# Patient Record
Sex: FEMALE | Race: OTHER | HISPANIC OR LATINO | Employment: FULL TIME | URBAN - METROPOLITAN AREA
[De-identification: names, ages, dates, MRNs, and addresses within clinical notes are randomized per-mention and may not be internally consistent; named-entity substitution may affect disease eponyms.]

---

## 2023-10-30 ENCOUNTER — HOSPITAL ENCOUNTER (OUTPATIENT)
Dept: RADIOLOGY | Facility: OTHER | Age: 23
Discharge: HOME | End: 2023-10-30
Attending: ORTHOPAEDIC SURGERY
Payer: COMMERCIAL

## 2023-10-30 ENCOUNTER — OFFICE VISIT (OUTPATIENT)
Dept: ORTHOPEDICS | Facility: CLINIC | Age: 23
End: 2023-10-30
Payer: COMMERCIAL

## 2023-10-30 VITALS — WEIGHT: 130 LBS

## 2023-10-30 DIAGNOSIS — M24.132 DEGENERATIVE TEAR OF TRIANGULAR FIBROCARTILAGE COMPLEX (TFCC) OF LEFT WRIST: Primary | ICD-10-CM

## 2023-10-30 DIAGNOSIS — M25.532 LEFT WRIST PAIN: Primary | ICD-10-CM

## 2023-10-30 DIAGNOSIS — G56.22 CUBITAL TUNNEL SYNDROME ON LEFT: ICD-10-CM

## 2023-10-30 DIAGNOSIS — M25.532 LEFT WRIST PAIN: ICD-10-CM

## 2023-10-30 PROCEDURE — 73110 XR WRIST COMPLETE 3 VIEWS LEFT: ICD-10-PCS | Mod: 26,LT,, | Performed by: RADIOLOGY

## 2023-10-30 PROCEDURE — 99204 OFFICE O/P NEW MOD 45 MIN: CPT | Mod: S$GLB,,, | Performed by: ORTHOPAEDIC SURGERY

## 2023-10-30 PROCEDURE — 73110 X-RAY EXAM OF WRIST: CPT | Mod: 26,LT,, | Performed by: RADIOLOGY

## 2023-10-30 PROCEDURE — 73110 X-RAY EXAM OF WRIST: CPT | Mod: TC,FY,LT

## 2023-10-30 PROCEDURE — 99999 PR PBB SHADOW E&M-NEW PATIENT-LVL II: ICD-10-PCS | Mod: PBBFAC,,, | Performed by: ORTHOPAEDIC SURGERY

## 2023-10-30 PROCEDURE — 99999 PR PBB SHADOW E&M-NEW PATIENT-LVL II: CPT | Mod: PBBFAC,,, | Performed by: ORTHOPAEDIC SURGERY

## 2023-10-30 PROCEDURE — 99204 PR OFFICE/OUTPT VISIT, NEW, LEVL IV, 45-59 MIN: ICD-10-PCS | Mod: S$GLB,,, | Performed by: ORTHOPAEDIC SURGERY

## 2023-10-30 NOTE — PROGRESS NOTES
[facility]  History & Physical  Orthopedics    SUBJECTIVE:      COVID-19 attestation:  This patient was treated during the COVID-19 pandemic.  This was discussed with the patient, they are aware of our current policies and procedures, were given the option of delaying their visit and or switching to a virtual visit, delaying their surgery when applicable, and they elect to proceed.    Chief Complaint: left wrist pain    Referring Provider: Self, Aaareferral     History of Present Illness:  Patient is a 23 y.o. right hand dominant adult who presents today with complaints of left wrist pain onset 9 years ago after her hand was hit with a soccer ball. She has continue to experience intermittent pain that worsen with active use of the hand. She recently started experiencing numbness and tingling to the small and ring fingers. Pain is affecting her ADLs and her job.     The patient is a/an  at [employer].    Onset of symptoms/DOI was 9 years.    Symptoms are aggravated by activity and movement.    Symptoms are alleviated by rest and immobilization.    Symptoms consist of pain and numbness/tingling.    The patient rates their pain as a 0/10.    Attempted treatment(s) and/or interventions include activity modifications, rest, rest and activity modification.     The patient denies any fevers, chills, N/V, D/C and presents for evaluation.       No past medical history on file.  No past surgical history on file.  Review of patient's allergies indicates:  No Known Allergies  Social History     Social History Narrative    Not on file     No family history on file.    No current outpatient medications on file.      Review of Systems:  As per HPI otherwise noncontributory    OBJECTIVE:      Vital Signs (Most Recent):  Vitals:    10/30/23 1009   Weight: 59 kg (130 lb)     There is no height or weight on file to calculate BMI.      Physical Exam:  Constitutional: The patient appears well-developed and  well-nourished. No distress.   Skin: No lesions appreciated  Head: Normocephalic and atraumatic.   Nose: Nose normal.   Ears: No deformities seen  Eyes: Conjunctivae and EOM are normal.   Neck: No tracheal deviation present.   Cardiovascular: Normal rate and intact distal pulses.    Pulmonary/Chest: Effort normal. No respiratory distress.   Abdominal: There is no guarding.   Neurological: The patient is alert.   Psychiatric: The patient has a normal mood and affect.     Left Hand/Wrist Examination:    Observation/Inspection:  Swelling  none    Deformity  none  Discoloration  none     Scars   none    Atrophy  none    HAND/WRIST EXAMINATION:   Finkelstein's Test   Neg  WHAT Test    Neg  Snuff box tenderness   Neg  Keith's Test    Neg  Hook of Hamate Tenderness  Neg  CMC grind    Neg  Circumduction test   Neg    Neurovascular Exam:   Digits WWP, brisk CR < 3s throughout  NVI motor/LTS to M/R/U nerves, radial pulse 2+  Tinel's Test - Carpal Tunnel  Neg  Tinel's Test - Cubital Tunnel  pos  Phalen's Test    Neg  Median Nerve Compression Test Neg      TTP about the ulnar side of the wrist. Medial epicondyle TTP. Small finger tingling with wrist compression.  ROM hand full, painless    ROM wrist full, painless    ROM elbow full, painless      Diagnostic Results:     Imaging - I independently viewed the patient's imaging as well as the radiology report.  Xrays of the patient's left hand  demonstrate no evidence of any acute fractures or dislocations or significant degenerative changes.    EMG - none    ASSESSMENT/PLAN:      23 y.o. adult with ulnar sided wrist pain.    Plan: The patient and I had a thorough discussion today.  We discussed the working diagnosis as well as several other potential alternative diagnoses.  Treatment options were discussed, both conservative and surgical.  Conservative treatment options would include things such as activity modifications, workplace modifications, a period of rest, oral vs topical  OTC and prescription anti-inflammatory medications, occupational therapy, splinting/bracing, immobilization, corticosteroid injections, and others.  Surgical options were discussed as well.     At this time, based on history, exam, chronicity and symptoms affecting ADLs, I would like to evaluate patient for possible TFCC tear and cubital tunnel syndrome. MRI and EMG ordered in clinic today. RTC after completion of studies for review.    Should the patient's symptoms worsen, persist, or fail to improve they should return for reevaluation and I would be happy to see them back anytime.    Terry Awan MD  Orthopaedic Surgery   Resident Physician, PGY-1  10/30/2023

## 2023-12-07 ENCOUNTER — PROCEDURE VISIT (OUTPATIENT)
Dept: NEUROLOGY | Facility: CLINIC | Age: 23
End: 2023-12-07
Payer: COMMERCIAL

## 2023-12-07 DIAGNOSIS — R20.2 NUMBNESS AND TINGLING IN LEFT HAND: ICD-10-CM

## 2023-12-07 DIAGNOSIS — R20.0 NUMBNESS AND TINGLING IN LEFT HAND: ICD-10-CM

## 2023-12-07 PROCEDURE — 95910 PR NERVE CONDUCTION STUDY; 7-8 STUDIES: ICD-10-PCS | Mod: S$GLB,,, | Performed by: PSYCHIATRY & NEUROLOGY

## 2023-12-07 PROCEDURE — 99203 PR OFFICE/OUTPT VISIT, NEW, LEVL III, 30-44 MIN: ICD-10-PCS | Mod: S$GLB,,, | Performed by: PSYCHIATRY & NEUROLOGY

## 2023-12-07 PROCEDURE — 95886 PR EMG COMPLETE, W/ NERVE CONDUCTION STUDIES, 5+ MUSCLES: ICD-10-PCS | Mod: S$GLB,,, | Performed by: PSYCHIATRY & NEUROLOGY

## 2023-12-07 PROCEDURE — 99203 OFFICE O/P NEW LOW 30 MIN: CPT | Mod: S$GLB,,, | Performed by: PSYCHIATRY & NEUROLOGY

## 2023-12-07 PROCEDURE — 95886 MUSC TEST DONE W/N TEST COMP: CPT | Mod: S$GLB,,, | Performed by: PSYCHIATRY & NEUROLOGY

## 2023-12-07 PROCEDURE — 95910 NRV CNDJ TEST 7-8 STUDIES: CPT | Mod: S$GLB,,, | Performed by: PSYCHIATRY & NEUROLOGY

## 2023-12-07 NOTE — PROCEDURES
EMG W/ ULTRASOUND AND NERVE CONDUCTION TEST 2 Extremities    Date/Time: 12/7/2023 3:00 PM    Performed by: Jose Palmer MD  Authorized by: Raphael Jessica MD                                                                 [facility] [address] Neurology    Subjective:       Patient ID: Ashleydom Blum is a 23 y.o. adult here for a EMG focused evaluation for arm pain. Previous visits and diagnostic evaluation reviewed.  Patient describes several years of left wrist and hand pain.  Recently over the past few months there has been numbness of the left hand specifically involving the left 4th and 5th digits.  Symptoms are overall intermittent and worse with maneuvers such as typing.  HPI  Review of patient's allergies indicates:  No Known Allergies   There were no vitals filed for this visit.   Chief Complaint: No chief complaint on file.    History reviewed. No pertinent past medical history.   Social History     Socioeconomic History    Marital status: Significant Other      Review of Systems:   No Fever  No SOB  No vomiting  No visual disturbance      Objective:      Physical Exam    Constitutional: Patient appears well-nourished.   Head: Normocephalic and atraumatic.   Mouth/Throat: Oropharynx is clear and moist.   Pulmonary/Chest: Effort normal.   Abdominal: Soft.   Skin: Skin is warm and dry.      General:  Patient is alert and cooperative.  Affect:  Patient is appropriate to surroundings and environment.  Language:  Speech is fluent.  HEENT:  There are no outward signs of trauma to head or face.  Cranial Nerves:  Pupils are equal round and reactive to light. Extra-ocular movements are intact. Face, tongue, and palate are symmetrical.  Motor:  Patient exhibits normal strength testing in bilateral proximal and distal upper extremities.  Reflexes:  Symmetrical in bilateral upper extremities.  Gait:  Ambulation is independent without use of cane or walker without signs of ataxia or  circumduction.  Cerebellar:  Normal finger to nose testing without dysmetria.  Sensory:  Intact to sensory modalities tested.  Musculoskeletal:  There is no severe tenderness to palpation and manipulation of the cervical spine region.   Assessment:       We reviewed and discussed at length results of EMG of the left upper extremity performed today which was normal.  Specifically no significant evidence of ulnar neuropathy.  We did discuss possibly repeating this study in 8-10 months if symptoms of numbness persist or worsen. These findings are available via media section of chart review.   1. Numbness and tingling in left hand              Plan:       We discussed treatment options at length. Recommend patient keep appointment with referring provider.         I spent a total of 35 minutes on the day of the visit. This includes face to face time and non-face to face time preparing to see the patient (eg, review of tests), obtaining and/or reviewing separately obtained history, documenting clinical information in the electronic or other health record, independently interpreting results and communicating results to the patient/family/caregiver, or care coordinator  .  Jose Palmer MD, FAAN   12/07/2023   3:20 PM     A dictation device was used to produce this document. Use of such devices sometimes results in grammatical errors or replacement of words that sound similarly.

## 2023-12-19 ENCOUNTER — HOSPITAL ENCOUNTER (OUTPATIENT)
Dept: RADIOLOGY | Facility: HOSPITAL | Age: 23
Discharge: HOME | End: 2023-12-19
Attending: ORTHOPAEDIC SURGERY
Payer: COMMERCIAL

## 2023-12-19 DIAGNOSIS — M24.132 DEGENERATIVE TEAR OF TRIANGULAR FIBROCARTILAGE COMPLEX (TFCC) OF LEFT WRIST: ICD-10-CM

## 2023-12-19 PROCEDURE — 73221 MRI WRIST WITHOUT CONTRAST LEFT: ICD-10-PCS | Mod: 26,LT,, | Performed by: RADIOLOGY

## 2023-12-19 PROCEDURE — 73221 MRI JOINT UPR EXTREM W/O DYE: CPT | Mod: TC,LT

## 2023-12-19 PROCEDURE — 73221 MRI JOINT UPR EXTREM W/O DYE: CPT | Mod: 26,LT,, | Performed by: RADIOLOGY

## 2024-01-29 ENCOUNTER — OFFICE VISIT (OUTPATIENT)
Dept: ORTHOPEDICS | Facility: CLINIC | Age: 24
End: 2024-01-29
Payer: COMMERCIAL

## 2024-01-29 DIAGNOSIS — M25.532 LEFT WRIST PAIN: Primary | ICD-10-CM

## 2024-01-29 PROCEDURE — 99999 PR PBB SHADOW E&M-EST. PATIENT-LVL II: CPT | Mod: PBBFAC,,, | Performed by: ORTHOPAEDIC SURGERY

## 2024-01-29 PROCEDURE — 99213 OFFICE O/P EST LOW 20 MIN: CPT | Mod: S$GLB,,, | Performed by: ORTHOPAEDIC SURGERY

## 2024-01-29 NOTE — PROGRESS NOTES
[facility]  History & Physical  Orthopedics    SUBJECTIVE:      COVID-19 attestation:  This patient was treated during the COVID-19 pandemic.  This was discussed with the patient, they are aware of our current policies and procedures, were given the option of delaying their visit and or switching to a virtual visit, delaying their surgery when applicable, and they elect to proceed.    Chief Complaint: left wrist pain    Referring Provider: No ref. provider found     History of Present Illness:  Patient is a 23 y.o. right hand dominant adult who presents today with in follow-up for ulnar-sided left wrist pain as well as numbness and tingling in the left small and ring fingers.  The patient reports that symptoms are stable from last visit.  EMG and MRI were obtained and the patient returns for these results and re-evaluation today with no other new complaints.    The patient is a/an  at [employer].    Onset of symptoms/DOI was 9 years.    Symptoms are aggravated by activity and movement.    Symptoms are alleviated by rest and immobilization.    Symptoms consist of pain and numbness/tingling.    The patient rates their pain as a 0/10.    Attempted treatment(s) and/or interventions include activity modifications, rest, rest and activity modification.     The patient denies any fevers, chills, N/V, D/C and presents for evaluation.       No past medical history on file.  No past surgical history on file.  Review of patient's allergies indicates:  No Known Allergies  Social History     Social History Narrative    Not on file     No family history on file.    No current outpatient medications on file.      Review of Systems:  As per HPI otherwise noncontributory    OBJECTIVE:      Vital Signs (Most Recent):  There were no vitals filed for this visit.    There is no height or weight on file to calculate BMI.      Physical Exam:  Constitutional: The patient appears well-developed and well-nourished.  No distress.   Skin: No lesions appreciated  Head: Normocephalic and atraumatic.   Nose: Nose normal.   Ears: No deformities seen  Eyes: Conjunctivae and EOM are normal.   Neck: No tracheal deviation present.   Cardiovascular: Normal rate and intact distal pulses.    Pulmonary/Chest: Effort normal. No respiratory distress.   Abdominal: There is no guarding.   Neurological: The patient is alert.   Psychiatric: The patient has a normal mood and affect.     Left Hand/Wrist Examination:    Observation/Inspection:  Swelling  none    Deformity  none  Discoloration  none     Scars   none    Atrophy  none    HAND/WRIST EXAMINATION:   Finkelstein's Test   Neg  WHAT Test    Neg  Snuff box tenderness   Neg  Keith's Test    Neg  Hook of Hamate Tenderness  Neg  CMC grind    Neg  Circumduction test   Neg    Neurovascular Exam:   Digits WWP, brisk CR < 3s throughout  NVI motor/LTS to M/R/U nerves, radial pulse 2+  Tinel's Test - Carpal Tunnel  Neg  Tinel's Test - Cubital Tunnel  negative today  Phalen's Test    Neg  Median Nerve Compression Test Neg      TTP about the ulnar side of the wrist    ROM hand full, painless    ROM wrist full, painless    ROM elbow full, painless      Diagnostic Results:     Imaging - I independently viewed the patient's imaging as well as the radiology report.  Xrays of the patient's left hand  demonstrate no evidence of any acute fractures or dislocations or significant degenerative changes.    MRI left wrist-Impression:     1. Intact triangular fibrocartilage and interosseous ligaments.  2. No osseous or articular abnormalities.  3. Intact flexor and extensor tendons.  4. No ganglion cyst.    EMG - normal study of the left upper extremity    ASSESSMENT/PLAN:      23 y.o. adult with left hand numbness, left ulnar-sided wrist pain      Plan: The patient and I had a thorough discussion today.  We discussed the working diagnosis as well as several other potential alternative diagnoses.  Treatment options  were discussed, both conservative and surgical.  Conservative treatment options would include things such as activity modifications, workplace modifications, a period of rest, oral vs topical OTC and prescription anti-inflammatory medications, occupational therapy, splinting/bracing, immobilization, corticosteroid injections, and others.  Surgical options were discussed as well.     At this point in time, MRI and EMG do not demonstrate any focal pathology.  I would recommend a course of prescription strength ibuprofen which will be sent to the patient's pharmacy today as well as a course of occupational therapy.  Wrist widget advised.  Follow-up in 2-3 months should symptoms worsen persist or fail to improve.

## 2024-03-25 ENCOUNTER — CLINICAL SUPPORT (OUTPATIENT)
Dept: REHABILITATION | Facility: OTHER | Age: 24
End: 2024-03-25
Attending: ORTHOPAEDIC SURGERY
Payer: COMMERCIAL

## 2024-03-25 DIAGNOSIS — M25.532 LEFT WRIST PAIN: ICD-10-CM

## 2024-03-25 PROCEDURE — 97110 THERAPEUTIC EXERCISES: CPT | Mod: PN

## 2024-03-25 PROCEDURE — 97165 OT EVAL LOW COMPLEX 30 MIN: CPT | Mod: PN

## 2024-03-25 NOTE — PATIENT INSTRUCTIONS
[facility], OCCUPATIONAL THERAPY  HOME EXERCISE PROGRAM         Composite wrist flexion stretch:  - Begin with your elbow bent and by your side  - Position hand palm down and make a gentle fist  - Use your other hand to gently bend fist down towards the floor  - You should feel a gentle stretch, no pain! If you need more of a stretch you can begin to straighten the elbow to the point you feel a comfortable stretch    Hold 30 seconds. Repeat 3x.      Alternative:      Composite wrist flexion stretch:  -Interlace your fingers as shown  -Then use your bottom most wrist to bend the top wrist into a more flexed position for a stretch.     Hold for 30 seconds. Repeat 3x.      Complete the following strengthening exercises 2x/day.  Hold each position x3 seconds then relax. Complete 20 repetitions each.       Resisted Wrist Extension   - Make a fist first  - With forearm resting palm down, resist upward movement   of hand with other hand.           Resisted Wrist Radial Deviation  With forearm resting with thumb up, use other hand to   resist upward movement of hand at wrist.       Therapist: VIC Garner/MONO

## 2024-03-26 PROBLEM — M25.532 LEFT WRIST PAIN: Status: ACTIVE | Noted: 2024-03-26

## 2024-03-26 NOTE — PLAN OF CARE
[facility] Occupational Therapy  Initial Evaluation     Date: 3/25/2024  Patient: Ashley Blum  Chart Number: [id number]    Therapy Diagnosis:   1. Left wrist pain  Ambulatory referral/consult to Physical/Occupational Therapy        Medical Diagnosis: M25.532 (ICD-10-CM) - Left wrist pain    Referring Physician: Raphael Jessica MD  Physician Orders: Eval and Treat  Date of Return to MD: PRN    Date of Injury: 9 years ago  Date of Surgery: NA    Evaluation Date: 3/25/2024  Authorization Period: 5/31/23 - 5/31/24  Plan of Care Expiration: 5/17/24  Visit #/ Visits Authorized: 1 of 1  FOTO Completion: Initial eval (3/25/2024)  FOTO #2:  FOTO #3:    Time In: 5:00 pm  Time Out: 5:40 pm  Total Appointment Time (timed & untimed codes): 40 min    Precautions: Standard    Subjective     History of Current Condition: Ashley Blum is a 23 y.o. year old Right hand dominant adult who reports Left ulnar wrist pain for the past 9 years. She reports it began when a friend kicked a soccer ball and as goalie she tried to deflect the ball, hitting the ulnar wrist. Within the last year, she developed numbness/tingling on radial thumb and SF in the morning. Ashley Blum is referred to Occupational Therapy for evaluation and treatment. Patient presents today alone.    Falls: none    Involved Side: Left  Dominant Side: Right    Date of Onset: 9 years ago  Imaging: MRI 10/30/23  Impression:  1. Intact triangular fibrocartilage and interosseous ligaments.  2. No osseous or articular abnormalities.  3. Intact flexor and extensor tendons.  4. No ganglion cyst.    EMG - normal study of the left upper extremity      Previous Therapy: none    Pain:  Functional Pain Scale Rating 0-10:   Current: 0/10  At Best: 0/10  At Worst: 5/10    Location: Left ulnar wrist  Description: throbbing, shooting, tingling  Aggravating Factors: holding phone, typing, holding bag of  groceries  Easing Factors: none    Functional Limitations/Social History:  Prior Level of Function: Independent with all ADLs/IADLs    Current Level of Function:   ADLs: pain at times washing hair, otherwise remains independent  IADLs: reports pain with heavy lifting (carrying groceries), uses primarily dominant RUE  Leisure: painting, writing, sedentary  Driving: Yes    Occupation:  lab tech at [employer]  Working presently: employed  Duties: computer work, typing    Patient's Goals for Therapy: to improve functional use of LUE, prevent it from getting worse    Past Medical History/Physical Systems Review:   Ashley Blum  has no past medical history on file.    Ashley Blum  has no past surgical history on file.    Ashley currently has no medications in their medication list.    Review of patient's allergies indicates:  No Known Allergies       Objective     Mental status: alert, oriented x3    Observation/Appearance:   No atrophy, skin colors changes or bruises noted    Sensation: Patient reports she wakes up with numbness/tingling in SF, not formally tested today      ELBOW, WRIST RANGE OF MOTION:   Measured in degrees of active motion with goniometer   Right  3/25/2024 Left  3/25/2024   Elbow Extension/Flexion WNL WNL   Pronation/Supination WNL WNL   Wrist Extension/Flexion 65/75 65/75   Ulnar/Radial Deviation 35/20 35/20   Composite Wrist Extension/Flexion 60/55 60/40       STRENGTH: (Measured in pounds using a Dynamometer and pinch meter)   Right  3/25/2024 Left  3/25/2024    Setting 2 60, 50, 55 40, 40, 45    Average 55 lb 42 lb   Key 9 9.3   3 Pt 13.6 13.4       Special Testing:  Nela elbow flexion test: negative    MMT:  Wrist ext: 5  Wrist flex: 5  RD: 5  UD: 5  Pro: 5/5, mild pain shooting up elbow from ECU  Sup: 5/5    Weightbearing:  R: 60 lb  L: 60 lb      Intake Outcome Measure for FOTO Initial Evaluation Survey    Therapist reviewed FOTO scores for Ashley  Phuong Blum on 3/25/2024.   FOTO documents entered into [employer] - see Media section.    Intake Score: 64%         Treatment     Treatment Time In: 5:30 pm  Treatment Time Out: 5:40 pm  Total Treatment time separate from Evaluation time: 10 min    Ashley performed therapeutic exercises for 10 minutes including:  - Applied KT I band about pisiform for dorsal boost, instructed to remove with cold soap/water if irritation occurs  - Composite wrist flexion stretch (3 x 30 sec)  - Isometrics wrist extension, RD (20 reps, 3 sec hold)      Patient Education and Home Exercises     Patient/Family Education Provided:   - Role of OT, goals for OT, scheduling/cancellations - pt verbalized understanding. Discussed insurance limitations with patient.    Written Home Exercises Provided: yes.  Exercises were reviewed and Ashley was able to demonstrate them prior to the end of the session.  Ashley demonstrated good  understanding of the education provided. See EMR under Patient Instructions for exercises provided during therapy sessions.     Pt was advised to perform these exercises free of pain, and to stop performing them if pain occurs.      Assessment     Ashley Blum is a 23 y.o. adult referred to outpatient occupational therapy and presents with a medical diagnosis of Left wrist pain. EMG and MRI imaging were normal, no significant findings. Her ROM and strength are WNL, mild tightness noted of extrinsic extensors. She has no pain with weightbearing. Pain is about ulnar wrist, along ECU with resisted pronation.  She will benefit from continued skilled OT services to progress with ROM and strength and facilitate increased functional use of LUE.    Following medical record review it is determined that pt will benefit from occupational therapy services in order to maximize pain free and/or functional use of left wrist. The following goals were discussed with the patient and patient is in agreement with  them as to be addressed in the treatment plan. The patient's rehab potential is Good.     Anticipated barriers to occupational therapy: none    Plan of care discussed with patient: Yes    Patient's spiritual, cultural and educational needs considered and patient is agreeable to the plan of care and goals as stated below:     Medical Necessity is demonstrated by the following  Occupational Profile/History  Co-morbidities and personal factors that may impact the plan of care [x] LOW: Brief chart review  [] MODERATE: Expanded chart review   [] HIGH: Extensive chart review    Moderate / High Support Documentation:      Examination  Performance deficits relating to physical, cognitive or psychosocial skills that result in activity limitations and/or participation restrictions  [x] LOW: addressing 1-3 Performance deficits  [] MODERATE: 3-5 Performance deficits  [] HIGH: 5+ Performance deficits (please support below)    Moderate / High Support Documentation:    Physical:  Muscle Power/Strength  Pain    Cognitive:  No Deficits    Psychosocial:    Habits  Routines  Rituals     Treatment Options [x] LOW: Limited options  [] MODERATE: Several options  [] HIGH: Multiple options      Decision Making/ Complexity Score: low         The following goals were discussed with the patient and patient is in agreement with them as to be addressed in the treatment plan.     Long Term Goals (to be met by discharge):  Date Goal Met:     1.) Ashley Blum will demonstrate significantly improved functional performance from re-assessment as measured by a FOTO Intake score of more than 74.    Goal Status:   In progress    2.) Ashley Blum will return to near to prior level of function for ADLs and household management reporting independence or modified independence.    Goal Status:   In progress    3. Ashley Blum will report pain 2 out of 10 at worst to increase functional use of affected hand  for work and leisure tasks.    Goal Status:   In progress     Short Term Goals (to be met by 4/23/24):  Date Goal Met:     Ashley Blum will be independent with home exercise program with written instructions.    Goal Status:   In progress    Ashley Blum will demonstrate 50 degrees of composite wrist flexion to improve functional performance in ADLs/work/leisure tasks.    Goal Status:   In progress    Ashley Blum will demonstrate no pain with MMT of wrist/forearm to improve functional grasp for ADLs/work/leisure tasks.    Goal Status:   In progress    Ashley Blum will demonstrate the ability to complete ADL/IADL tasks with 4/10 pain.    Goal Status:   In progress       Plan     Pt to be treated by Occupational Therapy 2 times per week for 12 weeks during the certification period from 3/25/2024 to 5/17/24 to achieve the established goals.     Treatment to include: Paraffin, Fluidotherapy, Manual therapy/joint mobilizations, Modalities for pain management, US 3 mhz, Therapeutic exercises/activities., Iontophoresis with 2.0 cc Dexamethasone, Strengthening, Orthotic Fabrication/Fit/Training, Edema Control, Electrical Modalities, Joint Protection, and Energy Conservation, as well as any other treatments deemed necessary based on the patient's needs or progress.       Lita Montiel OTR/L

## 2024-04-08 ENCOUNTER — CLINICAL SUPPORT (OUTPATIENT)
Dept: REHABILITATION | Facility: OTHER | Age: 24
End: 2024-04-08
Payer: COMMERCIAL

## 2024-04-08 DIAGNOSIS — M25.532 LEFT WRIST PAIN: Primary | ICD-10-CM

## 2024-04-08 PROCEDURE — 97018 PARAFFIN BATH THERAPY: CPT | Mod: PN | Performed by: OCCUPATIONAL THERAPIST

## 2024-04-08 PROCEDURE — 97530 THERAPEUTIC ACTIVITIES: CPT | Mod: PN | Performed by: OCCUPATIONAL THERAPIST

## 2024-04-08 PROCEDURE — 97140 MANUAL THERAPY 1/> REGIONS: CPT | Mod: PN | Performed by: OCCUPATIONAL THERAPIST

## 2024-04-08 NOTE — PROGRESS NOTES
Occupational Therapy Treatment Note     Date: 4/8/2024  Name: Ashley Blum  Clinic Number: [id number]    Therapy Diagnosis:   Encounter Diagnosis   Name Primary?    Left wrist pain Yes     Physician: Raphael Jessica MD    Medical Diagnosis: M25.532 (ICD-10-CM) - Left wrist pain  Physician Orders: Eval and Treat  Date of Return to MD: PRN     Date of Injury: 9 years ago  Date of Surgery: NA     Evaluation Date: 3/25/2024  Authorization Period: 5/31/23 - 5/31/24  Plan of Care Expiration: 5/17/24    FOTO Completion: Initial eval (3/25/2024)  FOTO #2:  FOTO #3:    Visit # / Visits authorized: 2 / 20    Time In: 9:00 am  Time Out: 9:45 am  Total Billable Time: 45 minutes    Precautions:  Standard      Subjective     Pt reports: she has less pain in L wrist  Ashley Blum was compliant with home exercise program given last session.   Response to previous treatment: decreased pain    Pain: 2/10  Location: left wrist     OBJECTIVE     ELBOW, WRIST RANGE OF MOTION:   Measured in degrees of active motion with goniometer    Right  3/25/2024 Left  3/25/2024   Elbow Extension/Flexion WNL WNL   Pronation/Supination WNL WNL   Wrist Extension/Flexion 65/75 65/75   Ulnar/Radial Deviation 35/20 35/20   Composite Wrist Extension/Flexion 60/55 60/40         STRENGTH: (Measured in pounds using a Dynamometer and pinch meter)    Right  3/25/2024 Left  3/25/2024    Setting 2 60, 50, 55 40, 40, 45    Average 55 lb 42 lb   Key 9 9.3   3 Pt 13.6 13.4         Special Testing:  Nela elbow flexion test: negative     MMT:  Wrist ext: 5  Wrist flex: 5  RD: 5  UD: 5  Pro: 5/5, mild pain shooting up elbow from ECU  Sup: 5/5     Weightbearing:  R: 60 lb  L: 60 lb    Treatment     Ashley received the following supervised modalities after being cleared for contradictions for 10 minutes:   -Paraffin to decrease pain and stiffness and increase tissue elasticity    Ashley received the following manual therapy  techniques for 10 minutes:   -IASTM to decrease stiffness and adhesions in surrounding tissues    Ashley received therapeutic activities for 25 minutes including:  -wrist stretches 2 x 30 sec hold  -wrist maze x 2 min  -wrist 3 ways with 1# x 20   -red theraputty ,  and pull and dowel press x 5 min  -Applied KT tape along ECU with anchor around ulnar wrist    Home Exercises and Education Provided     Education provided:   - diagnosis, precautions, activity modification and progression of treatment  - Progress towards goals     Written Home Exercises Provided: Patient instructed to cont prior HEP.  Exercises were reviewed and Ashley was able to demonstrate them prior to the end of the session.  Ashley demonstrated good  understanding of the HEP provided.   .   See EMR under Patient Instructions for exercises provided prior visit.        Assessment     Pt would continue to benefit from skilled OT. She arrived with reports of decreased pain in ulnar wrist. Pt demonstrates positive ECU synergy test. She tolerated all activities without difficulty. Educated pt on activity modification. Plan to progress as tolerated.     Ashley is progressing well towards Ashley Blum's goals and there are no updates to goals at this time. Pt prognosis is Excellent.     Pt will continue to benefit from skilled outpatient occupational therapy to address the deficits listed in the problem list on initial evaluation provide pt/family education and to maximize pt's level of independence in the home and community environment.     Anticipated barriers to occupational therapy: none    Pt's spiritual, cultural and educational needs considered and pt agreeable to plan of care and goals.    Goals:  Long Term Goals (to be met by discharge):  Date Goal Met:       1.) Ashley Baca Kulwant will demonstrate significantly improved functional performance from re-assessment as measured by a FOTO Intake score of more than  74.     Goal Status:   In progress     2.) Ashley Blum will return to near to prior level of function for ADLs and household management reporting independence or modified independence.     Goal Status:   In progress     3. Ashley Blum will report pain 2 out of 10 at worst to increase functional use of affected hand for work and leisure tasks.     Goal Status:   In progress      Short Term Goals (to be met by 4/23/24):  Date Goal Met:       Ashley Blum will be independent with home exercise program with written instructions.     Goal Status:   In progress     Ashley Blum will demonstrate 50 degrees of composite wrist flexion to improve functional performance in ADLs/work/leisure tasks.     Goal Status:   In progress     Ashley Blum will demonstrate no pain with MMT of wrist/forearm to improve functional grasp for ADLs/work/leisure tasks.     Goal Status:   In progress     Ashley Blum will demonstrate the ability to complete ADL/IADL tasks with 4/10 pain.     Goal Status:   In progress       Plan   Continue with POC  Updates/Grading for next session: progress as tolerated      Margaret Boasberg, OT

## 2024-08-28 ENCOUNTER — TELEPHONE (OUTPATIENT)
Dept: INTERNAL MEDICINE | Facility: CLINIC | Age: 24
End: 2024-08-28
Payer: COMMERCIAL

## 2024-08-28 NOTE — TELEPHONE ENCOUNTER
Called and spoke with pt---Ashley was already informed about how to go about dealing with insurance and medical records.

## 2024-08-28 NOTE — TELEPHONE ENCOUNTER
----- Message from Dain Cole sent at 8/28/2024 10:38 AM CDT -----  Contact: [phone]@[email or web address]  Good morning patient would like a call back to discuss if her ins is covered by the doc and also to see how to get all of her old records sent to the doc now. Please call patient to advise  [phone]

## 2024-10-18 ENCOUNTER — OFFICE VISIT (OUTPATIENT)
Dept: INTERNAL MEDICINE | Facility: CLINIC | Age: 24
End: 2024-10-18
Payer: COMMERCIAL

## 2024-10-18 VITALS
HEIGHT: 62 IN | OXYGEN SATURATION: 97 % | HEART RATE: 80 BPM | SYSTOLIC BLOOD PRESSURE: 116 MMHG | BODY MASS INDEX: 25.55 KG/M2 | DIASTOLIC BLOOD PRESSURE: 84 MMHG | WEIGHT: 138.88 LBS

## 2024-10-18 DIAGNOSIS — Z00.00 ANNUAL PHYSICAL EXAM: Primary | ICD-10-CM

## 2024-10-18 DIAGNOSIS — Z12.4 CERVICAL CANCER SCREENING: ICD-10-CM

## 2024-10-18 DIAGNOSIS — Z30.9 ENCOUNTER FOR CONTRACEPTIVE MANAGEMENT, UNSPECIFIED TYPE: ICD-10-CM

## 2024-10-18 DIAGNOSIS — L68.0 HIRSUTISM: ICD-10-CM

## 2024-10-18 PROCEDURE — 99385 PREV VISIT NEW AGE 18-39: CPT | Mod: S$GLB,,, | Performed by: PHYSICIAN ASSISTANT

## 2024-10-18 PROCEDURE — 99999 PR PBB SHADOW E&M-EST. PATIENT-LVL IV: CPT | Mod: PBBFAC,,, | Performed by: PHYSICIAN ASSISTANT

## 2024-10-18 RX ORDER — SERTRALINE HYDROCHLORIDE 200 MG/1
CAPSULE ORAL
COMMUNITY
Start: 2024-03-18

## 2024-10-18 RX ORDER — SERTRALINE HYDROCHLORIDE 100 MG/1
100 TABLET, FILM COATED ORAL DAILY
COMMUNITY
End: 2024-10-18 | Stop reason: SDUPTHER

## 2024-10-18 RX ORDER — BUPROPION HYDROCHLORIDE 150 MG/1
150 TABLET ORAL DAILY
COMMUNITY
End: 2024-10-18 | Stop reason: SDUPTHER

## 2024-10-18 RX ORDER — BUPROPION HYDROCHLORIDE 150 MG/1
150 TABLET, EXTENDED RELEASE ORAL
COMMUNITY
Start: 2022-11-17

## 2024-10-18 NOTE — PATIENT INSTRUCTIONS
Spironolactone for facial hair    Over the counter fungal cream like Lotrimin     Let me know if you would like to see Dermatology    Referral to GYN    Schedule fasting lab.

## 2024-10-20 RX ORDER — LEVONORGESTREL 13.5 MG/1
1 INTRAUTERINE DEVICE INTRAUTERINE ONCE
COMMUNITY

## 2024-10-20 NOTE — PROGRESS NOTES
Subjective:       Patient ID: Ashleydom Blum is a 24 y.o. adult.    Chief Complaint: Annual Exam      Established pt of No, Primary Doctor (new to me)    HPI    New pt here for annual exam          Has concerns about menstrual cycle, hormones and fertility. Hx of menorrhagia and dysmenorrhea, has tried Copper IUD, now with Ruby, with improvement, c/o facial hair.    Follows with outside psychiatrist, Dr. Rogers for ADD and ?autism spectrum disorder, on Wellbutrin and Zoloft.       Past Medical History:   Diagnosis Date    Depression     Migraines        Social History     Tobacco Use    Smoking status: Never     Passive exposure: Never    Smokeless tobacco: Never    Tobacco comments:     N/A   Substance Use Topics    Alcohol use: Yes     Alcohol/week: 2.0 - 3.0 standard drinks of alcohol     Types: 2 - 3 Glasses of wine per week     Comment: I drink on average, 2-4 different occasions per month    Drug use: Not Currently     Types: Marijuana     Comment[place]       Review of patient's allergies indicates:  No Known Allergies      Current Outpatient Medications:     buPROPion (WELLBUTRIN SR) 150 MG TBSR 12 hr tablet, 150 mg., Disp: , Rfl:     sertraline 200 mg Cap, , Disp: , Rfl:     Family History   Problem Relation Name Age of Onset    Migraines Mother      Post-traumatic stress disorder Mother      Arthritis Father      Hypertension Father      Asthma Brother      Hypertension Maternal Grandmother      Diabetes Maternal Grandmother      Post-traumatic stress disorder Maternal Grandmother      Dementia Maternal Grandmother      Post-traumatic stress disorder Maternal Grandfather         History reviewed. No pertinent surgical history.    Review of Systems   Constitutional:  Negative for chills, fever and unexpected weight change.   Respiratory:  Negative for cough, shortness of breath and wheezing.    Cardiovascular:  Negative for chest pain  "and leg swelling.   Gastrointestinal:  Negative for abdominal pain, nausea and vomiting.   Genitourinary:         As per HPI   Integumentary:  Negative for rash.   Neurological:  Negative for weakness, light-headedness and headaches.       Objective: /84 (BP Location: Right arm, Patient Position: Sitting)   Pulse 80   Ht 5' 2" (1.575 m)   Wt 63 kg (138 lb 14.2 oz)   SpO2 97%   BMI 25.40 kg/m²         Physical Exam  Vitals reviewed.   Constitutional:       General: Ashley is not in acute distress.     Appearance: Ashley is well-developed. Ashley is not ill-appearing.   HENT:      Head: Normocephalic and atraumatic.      Right Ear: Tympanic membrane, ear canal and external ear normal.      Left Ear: Tympanic membrane, ear canal and external ear normal.   Cardiovascular:      Rate and Rhythm: Normal rate and regular rhythm.      Heart sounds: No murmur heard.  Pulmonary:      Effort: Pulmonary effort is normal.      Breath sounds: Normal breath sounds. No wheezing or rales.   Abdominal:      General: Bowel sounds are normal.      Palpations: Abdomen is soft.      Tenderness: There is no abdominal tenderness.   Musculoskeletal:      Right lower leg: No edema.      Left lower leg: No edema.   Lymphadenopathy:      Cervical: No cervical adenopathy.   Skin:     General: Skin is warm and dry.      Findings: Rash (small scaly patch under right breast) present.      Comments: Coarse hairs noted to chin   Neurological:      Mental Status: Ashley is alert and oriented to person, place, and time.   Psychiatric:         Mood and Affect: Mood normal.         Assessment:       1. Annual physical exam    2. Encounter for contraceptive management, unspecified type    3. Hirsutism    4. Cervical cancer screening        Plan:             Ashley was seen today for annual exam.    Diagnoses and all orders for this visit:    Annual physical exam  HM reviewed and updated  -     CBC Auto Differential; Future  -     Comprehensive " Metabolic Panel; Future  -     TSH; Future  -     Lipid Panel; Future  -     Hemoglobin A1C; Future  -     Vitamin D; Future    Encounter for contraceptive management, unspecified type  -     Ambulatory referral/consult to Gynecology; Future    Hirsutism  -     Ambulatory referral/consult to Gynecology; Future  -     TESTOSTERONE; Future    Cervical cancer screening  -     Ambulatory referral/consult to Gynecology; Future      Complete GIOVANI to obtain records from [place]    Patient Instructions   Spironolactone for facial hair    Over the counter fungal cream like Lotrimin     Let me know if you would like to see Dermatology    Referral to GYN    Schedule fasting lab.     Prudence Rao PA-C

## 2024-10-21 ENCOUNTER — LAB VISIT (OUTPATIENT)
Dept: LAB | Facility: HOSPITAL | Age: 24
End: 2024-10-21
Payer: COMMERCIAL

## 2024-10-21 DIAGNOSIS — Z00.00 ANNUAL PHYSICAL EXAM: ICD-10-CM

## 2024-10-21 DIAGNOSIS — L68.0 HIRSUTISM: ICD-10-CM

## 2024-10-21 LAB
25(OH)D3+25(OH)D2 SERPL-MCNC: 18 NG/ML (ref 30–96)
ALBUMIN SERPL BCP-MCNC: 4.3 G/DL (ref 3.5–5.2)
ALP SERPL-CCNC: 83 U/L (ref 40–150)
ALT SERPL W/O P-5'-P-CCNC: 26 U/L (ref 10–44)
ANION GAP SERPL CALC-SCNC: 10 MMOL/L (ref 8–16)
AST SERPL-CCNC: 31 U/L (ref 10–40)
BASOPHILS # BLD AUTO: 0.03 K/UL (ref 0–0.2)
BASOPHILS NFR BLD: 0.4 % (ref 0–1.9)
BILIRUB SERPL-MCNC: 0.5 MG/DL (ref 0.1–1)
BUN SERPL-MCNC: 8 MG/DL (ref 6–20)
CALCIUM SERPL-MCNC: 9.6 MG/DL (ref 8.7–10.5)
CHLORIDE SERPL-SCNC: 106 MMOL/L (ref 95–110)
CHOLEST SERPL-MCNC: 218 MG/DL (ref 120–199)
CHOLEST/HDLC SERPL: 3.5 {RATIO} (ref 2–5)
CO2 SERPL-SCNC: 23 MMOL/L (ref 23–29)
CREAT SERPL-MCNC: 0.8 MG/DL (ref 0.5–1.4)
DIFFERENTIAL METHOD BLD: NORMAL
EOSINOPHIL # BLD AUTO: 0.1 K/UL (ref 0–0.5)
EOSINOPHIL NFR BLD: 1.1 % (ref 0–8)
ERYTHROCYTE [DISTWIDTH] IN BLOOD BY AUTOMATED COUNT: 12.2 % (ref 11.5–14.5)
EST. GFR  (NO RACE VARIABLE): >60 ML/MIN/1.73 M^2
ESTIMATED AVG GLUCOSE: 94 MG/DL (ref 68–131)
GLUCOSE SERPL-MCNC: 80 MG/DL (ref 70–110)
HBA1C MFR BLD: 4.9 % (ref 4–5.6)
HCT VFR BLD AUTO: 41.8 % (ref 37–48.5)
HDLC SERPL-MCNC: 62 MG/DL (ref 40–75)
HDLC SERPL: 28.4 % (ref 20–50)
HGB BLD-MCNC: 14.2 G/DL (ref 12–16)
IMM GRANULOCYTES # BLD AUTO: 0.02 K/UL (ref 0–0.04)
IMM GRANULOCYTES NFR BLD AUTO: 0.3 % (ref 0–0.5)
LDLC SERPL CALC-MCNC: 122.8 MG/DL (ref 63–159)
LYMPHOCYTES # BLD AUTO: 1.9 K/UL (ref 1–4.8)
LYMPHOCYTES NFR BLD: 27.5 % (ref 18–48)
MCH RBC QN AUTO: 30.6 PG (ref 27–31)
MCHC RBC AUTO-ENTMCNC: 34 G/DL (ref 32–36)
MCV RBC AUTO: 90 FL (ref 82–98)
MONOCYTES # BLD AUTO: 0.6 K/UL (ref 0.3–1)
MONOCYTES NFR BLD: 8.4 % (ref 4–15)
NEUTROPHILS # BLD AUTO: 4.4 K/UL (ref 1.8–7.7)
NEUTROPHILS NFR BLD: 62.3 % (ref 38–73)
NONHDLC SERPL-MCNC: 156 MG/DL
NRBC BLD-RTO: 0 /100 WBC
PLATELET # BLD AUTO: 370 K/UL (ref 150–450)
PMV BLD AUTO: 9.5 FL (ref 9.2–12.9)
POTASSIUM SERPL-SCNC: 3.6 MMOL/L (ref 3.5–5.1)
PROT SERPL-MCNC: 7.7 G/DL (ref 6–8.4)
RBC # BLD AUTO: 4.64 M/UL (ref 4–5.4)
SODIUM SERPL-SCNC: 139 MMOL/L (ref 136–145)
TESTOST SERPL-MCNC: 70 NG/DL (ref 5–73)
TRIGL SERPL-MCNC: 166 MG/DL (ref 30–150)
TSH SERPL DL<=0.005 MIU/L-ACNC: 1.46 UIU/ML (ref 0.4–4)
WBC # BLD AUTO: 7.03 K/UL (ref 3.9–12.7)

## 2024-10-21 PROCEDURE — 85025 COMPLETE CBC W/AUTO DIFF WBC: CPT | Performed by: PHYSICIAN ASSISTANT

## 2024-10-21 PROCEDURE — 80061 LIPID PANEL: CPT | Performed by: PHYSICIAN ASSISTANT

## 2024-10-21 PROCEDURE — 80053 COMPREHEN METABOLIC PANEL: CPT | Performed by: PHYSICIAN ASSISTANT

## 2024-10-21 PROCEDURE — 82306 VITAMIN D 25 HYDROXY: CPT | Performed by: PHYSICIAN ASSISTANT

## 2024-10-21 PROCEDURE — 84403 ASSAY OF TOTAL TESTOSTERONE: CPT | Performed by: PHYSICIAN ASSISTANT

## 2024-10-21 PROCEDURE — 84443 ASSAY THYROID STIM HORMONE: CPT | Performed by: PHYSICIAN ASSISTANT

## 2024-10-21 PROCEDURE — 83036 HEMOGLOBIN GLYCOSYLATED A1C: CPT | Performed by: PHYSICIAN ASSISTANT

## 2024-10-21 PROCEDURE — 36415 COLL VENOUS BLD VENIPUNCTURE: CPT | Performed by: PHYSICIAN ASSISTANT

## 2024-11-11 ENCOUNTER — LAB VISIT (OUTPATIENT)
Dept: LAB | Facility: HOSPITAL | Age: 24
End: 2024-11-11
Payer: COMMERCIAL

## 2024-11-11 ENCOUNTER — OFFICE VISIT (OUTPATIENT)
Dept: OBSTETRICS AND GYNECOLOGY | Facility: CLINIC | Age: 24
End: 2024-11-11
Payer: COMMERCIAL

## 2024-11-11 VITALS
HEIGHT: 62 IN | DIASTOLIC BLOOD PRESSURE: 86 MMHG | WEIGHT: 136 LBS | SYSTOLIC BLOOD PRESSURE: 124 MMHG | BODY MASS INDEX: 25.03 KG/M2

## 2024-11-11 DIAGNOSIS — Z12.4 CERVICAL CANCER SCREENING: ICD-10-CM

## 2024-11-11 DIAGNOSIS — Z01.419 ROUTINE GYNECOLOGICAL EXAMINATION: ICD-10-CM

## 2024-11-11 DIAGNOSIS — Z01.419 ROUTINE GYNECOLOGICAL EXAMINATION: Primary | ICD-10-CM

## 2024-11-11 DIAGNOSIS — Z30.9 ENCOUNTER FOR CONTRACEPTIVE MANAGEMENT, UNSPECIFIED TYPE: ICD-10-CM

## 2024-11-11 DIAGNOSIS — Z11.3 SCREENING EXAMINATION FOR STI: ICD-10-CM

## 2024-11-11 LAB
HBV SURFACE AG SERPL QL IA: NORMAL
HCV AB SERPL QL IA: NORMAL
HIV 1+2 AB+HIV1 P24 AG SERPL QL IA: NORMAL
TREPONEMA PALLIDUM IGG+IGM AB [PRESENCE] IN SERUM OR PLASMA BY IMMUNOASSAY: NONREACTIVE

## 2024-11-11 PROCEDURE — 88175 CYTOPATH C/V AUTO FLUID REDO: CPT | Performed by: NURSE PRACTITIONER

## 2024-11-11 PROCEDURE — 87491 CHLMYD TRACH DNA AMP PROBE: CPT | Performed by: NURSE PRACTITIONER

## 2024-11-11 PROCEDURE — 36415 COLL VENOUS BLD VENIPUNCTURE: CPT | Performed by: NURSE PRACTITIONER

## 2024-11-11 PROCEDURE — 87340 HEPATITIS B SURFACE AG IA: CPT | Performed by: NURSE PRACTITIONER

## 2024-11-11 PROCEDURE — 86803 HEPATITIS C AB TEST: CPT | Performed by: NURSE PRACTITIONER

## 2024-11-11 PROCEDURE — 86593 SYPHILIS TEST NON-TREP QUANT: CPT | Performed by: NURSE PRACTITIONER

## 2024-11-11 PROCEDURE — 87389 HIV-1 AG W/HIV-1&-2 AB AG IA: CPT | Performed by: NURSE PRACTITIONER

## 2024-11-11 PROCEDURE — 99999 PR PBB SHADOW E&M-EST. PATIENT-LVL III: CPT | Mod: PBBFAC,,, | Performed by: NURSE PRACTITIONER

## 2024-11-11 NOTE — PROGRESS NOTES
Chief Complaint: Well Woman Exam     HPI:      Ashley is a 24 y.o. No obstetric history on file. who presents today for well woman exam.      Denies any breast, vaginal, urinary complaints or pelvic pain today.    Menses are regular w/ Ruby. Patient's last menstrual period was 10/17/2024. Periods overall monthly, last 3-5 days with light flow with Ruby IUD     Ashley is not currently sexually active. She is currently using Ruby inserted in 5/2022 for contraception. She would like STI screening today.    Has never had a pap test before.     Gardasil:Completed     She denies family history of breast, GYN, colon, or  cancers.      She does participate in regular exercise - 3-4x/week - pilates, HIIT, yoga   She denies tobacco use.     PCP: Prudence Rao PA-C     Past Medical History:   Diagnosis Date    Depression     Migraines        Current Outpatient Medications:     buPROPion (WELLBUTRIN SR) 150 MG TBSR 12 hr tablet, 150 mg., Disp: , Rfl:     levonorgestreL (RUBY) 14 mcg/24 hr (3 yrs) 13.5 mg IUD, 1 each by Intrauterine route once., Disp: , Rfl:     sertraline 200 mg Cap, , Disp: , Rfl:    Review of patient's allergies indicates:  No Known Allergies  History reviewed. No pertinent surgical history.  Social History     Tobacco Use    Smoking status: Never     Passive exposure: Never    Smokeless tobacco: Never    Tobacco comments:     N/A   Substance Use Topics    Alcohol use: Yes     Alcohol/week: 2.0 - 3.0 standard drinks of alcohol     Types: 2 - 3 Glasses of wine per week     Comment: I drink on average, 2-4 different occasions per month    Drug use: Not Currently     Types: Marijuana     Comment[place]     Family History   Problem Relation Name Age of Onset    Migraines Mother      Post-traumatic stress disorder Mother      Arthritis Father      Hypertension Father      Asthma Brother      Hypertension Maternal Grandmother      Diabetes Maternal  "Grandmother      Post-traumatic stress disorder Maternal Grandmother      Dementia Maternal Grandmother      Post-traumatic stress disorder Maternal Grandfather         ROS:     GENERAL: Feeling well overall.   CARDIOVASCULAR: Denies palpitations or chest pain.   RESPIRATORY: Denies shortness of breath.  BREASTS: see HPI.  ABDOMEN: Denies constipation, diarrhea, blood in stool.  URINARY: see HPI.  REPRODUCTIVE: see HPI.  PSYCHIATRIC: Denies uncontrolled depression or anxiety.    Physical Exam:     /86   Ht 5' 2" (1.575 m)   Wt 61.7 kg (136 lb 0.4 oz)   LMP 10/17/2024   BMI 24.88 kg/m²   Body mass index is 24.88 kg/m².     APPEARANCE: Well nourished, well developed, in no acute distress.  PSYCH: Appropriate mood and affect.  SKIN: No acne or hirsutism.  CARDIOVASCULAR: Regular rate and rhythm. No edema of peripheral extremities.  PULMONARY: Effort and breath sounds normal.  NECK: Neck symmetric without masses. No thyromegaly.  NODES: No axillary lymph node enlargement.  BREASTS: Deferred  PELVIC: Normal external genitalia without lesions.  Normal hair distribution.  Adequate perineal body, normal urethral meatus.  Vagina moist and well rugated. Without lesions. Vagina without abnormal discharge.  IUD strings visible at the cervical os.   Cervix without lesions, abnormal discharge, or tenderness.. No significant cystocele or rectocele.  Bimanual exam deferred - asymptomatic, low risk    Verbal consent obtained    Chaperoned by: KASIE Edwards     Assessment/Plan:     Routine gynecological examination  -     Ambulatory referral/consult to Gynecology  -     Device Authorization Order  -     C. trachomatis/N. gonorrhoeae by AMP DNA [facility]; Cervicovaginal  -     Hepatitis C Antibody; Future; Expected date: 11/11/2024  -     Hepatitis B Surface Antigen; Future; Expected date: 11/11/2024  -     HIV 1/2 Ag/Ab (4th Gen); Future; Expected date: 11/11/2024  -     Treponema Pallidium Antibodies IgG, IgM; Future; Expected " date: 11/11/2024  -     Liquid-Based Pap Smear, Screening    Cervical cancer screening  -     Ambulatory referral/consult to Gynecology  -     Liquid-Based Pap Smear, Screening    Screening examination for STI  -     C. trachomatis/N. gonorrhoeae by AMP DNA [facility]; Cervicovaginal  -     Hepatitis C Antibody; Future; Expected date: 11/11/2024  -     Hepatitis B Surface Antigen; Future; Expected date: 11/11/2024  -     HIV 1/2 Ag/Ab (4th Gen); Future; Expected date: 11/11/2024  -     Treponema Pallidium Antibodies IgG, IgM; Future; Expected date: 11/11/2024    Encounter for contraceptive management, unspecified type  -     Ambulatory referral/consult to Gynecology  -     Device Authorization Order        PLAN:    Pap test collected  GC/CT collected; serum STI screening ordered  Gardasil up to date  Contraception - Ruby IUD - inserted in 5/2022     Follow up for Ruby IUD removal/Mirena IUD insertion in 5/2025 .    Counseling:     Patient was counseled today on the recommendation for yearly wellness exams, importance of breast self awareness and annual mammograms, as well as the current ASCCP pap guidelines. She was counseled on importance of regular exercise. She is to see her PCP for other health maintenance.     Use of the [facility] Patient Portal discussed and encouraged during today's visit.

## 2024-11-12 LAB
C TRACH DNA SPEC QL NAA+PROBE: NOT DETECTED
N GONORRHOEA DNA SPEC QL NAA+PROBE: NOT DETECTED

## 2024-11-15 LAB
FINAL PATHOLOGIC DIAGNOSIS: NORMAL
Lab: NORMAL

## 2025-02-17 ENCOUNTER — OFFICE VISIT (OUTPATIENT)
Dept: DERMATOLOGY | Facility: CLINIC | Age: 25
End: 2025-02-17
Payer: COMMERCIAL

## 2025-02-17 DIAGNOSIS — L70.0 ACNE VULGARIS: Primary | ICD-10-CM

## 2025-02-17 RX ORDER — TRETINOIN 0.25 MG/G
CREAM TOPICAL
Qty: 45 G | Refills: 2 | Status: SHIPPED | OUTPATIENT
Start: 2025-02-17

## 2025-02-17 RX ORDER — CLINDAMYCIN PHOSPHATE 10 MG/G
GEL TOPICAL
Qty: 60 ML | Refills: 2 | Status: SHIPPED | OUTPATIENT
Start: 2025-02-17

## 2025-02-17 NOTE — PROGRESS NOTES
Subjective:      Patient ID:  Ashley Blum is a 24 y.o. adult who presents for   Chief Complaint   Patient presents with    Acne     face     Pt is present for acne. Reports combination skin (oily T zone, dry around mouth). Flares with cycle. Currently Has IUD. Interested in acne scar treatment.    Patient with new area of concern:   Location: face  Duration: Since puberty  S/S: none  Previous treatments: Cerave facial cleanser, Diamond's toner, HA serum & Neutrogena moisturizer         Review of Systems   Skin:  Negative for itching and rash.       Objective:   Physical Exam   Constitutional: Ashley appears well-developed and well-nourished. No distress.   Neurological: Ashley is alert and oriented to person, place, and time. Ashley is not disoriented.   Psychiatric: Ashley has a normal mood and affect.   Skin:   Areas Examined (abnormalities noted in diagram):   Head / Face Inspection Performed                      Diagram Legend     Erythematous scaling macule/papule c/w actinic keratosis       Vascular papule c/w angioma      Pigmented verrucoid papule/plaque c/w seborrheic keratosis      Yellow umbilicated papule c/w sebaceous hyperplasia      Irregularly shaped tan macule c/w lentigo     1-2 mm smooth white papules consistent with Milia      Movable subcutaneous cyst with punctum c/w epidermal inclusion cyst      Subcutaneous movable cyst c/w pilar cyst      Firm pink to brown papule c/w dermatofibroma      Pedunculated fleshy papule(s) c/w skin tag(s)      Evenly pigmented macule c/w junctional nevus     Mildly variegated pigmented, slightly irregular-bordered macule c/w mildly atypical nevus      Flesh colored to evenly pigmented papule c/w intradermal nevus       Pink pearly papule/plaque c/w basal cell carcinoma      Erythematous hyperkeratotic cursted plaque c/w SCC      Surgical scar with no sign of skin cancer recurrence      Open and closed comedones      Inflammatory papules and  "pustules      Verrucoid papule consistent consistent with wart     Erythematous eczematous patches and plaques     Dystrophic onycholytic nail with subungual debris c/w onychomycosis     Umbilicated papule    Erythematous-base heme-crusted tan verrucoid plaque consistent with inflamed seborrheic keratosis     Erythematous Silvery Scaling Plaque c/w Psoriasis     See annotation      Assessment / Plan:        Acne vulgaris  -     tretinoin (RETIN-A) 0.025 % cream; Apply pea-sized amount to face starting every two nights and then increasing to every other night and then nightly as tolerated. Moisturize after.  Dispense: 45 g; Refill: 2  -     clindamycin phosphate 1% 1 % gel; aaa on face qam  Dispense: 60 mL; Refill: 2    Morning acne regimen:  ?  Wash face with Cerave BP 4% cleanser. See below for suggestions.  Reviewed side effects of BPO wash may include but are not limited to  irritation, bleaching of towels, bleaching of pillowcases, mild burning/tingling sensation.    ?  Apply a thin film of clindamycin to individual breakouts, or to the entire face if needed.  ?  If skin feels dry, apply a fragrance-free moisturizer, such as CeraVe AM lotion  ?  Apply a broad-spectrum sunscreen with SPF 30 or higher (This is especially important to avoid "dark spots" that can follow an acne lesion.)    Evening acne regimen:  ?  Wash face with gentle cleanser or Can use Cerave Sal acid 2% cleanser if noticing a lot of blackheads.  See below for suggestions.  ?  Apply a pea-sized amount of tretinoin 0.025% (retinoid) to the entire face.    Tretinoin: We reviewed that a pea sized amount of the topical retinoid is to be applied to the entire face at night and that it is not spot treatment. Patient to use every other night or 3 nights a week and gradually increase to goal of nightly use (as tolerated). Side effects reviewed to include but not limited to redness, dryness, flaking, burning/tingling sensation, skin irritation, and " feeling of tightness.       ?  Apply a fragrance-free moisturizer, such as CeraVe PM lotion, if needed for dryness.     Also can use Vitamin C for lightening of PIPA.     Selene and Pure recommended for acne scar procedures.     Follow up in about 3 months (around 5/17/2025).

## 2025-02-17 NOTE — PATIENT INSTRUCTIONS
Acne Treatment    Retinoids (e.g. adapalene, tretinoin, tazarotene) are vitamin A derivatives that are the mainstay of acne therapy. The skin often becomes dry, red, or irritated when first using them--this is a normal period of adjustment.   Use only a pea-sized amount for the entire face to avoid excess irritation.   If your skin is sensitive, begin by using the medication two nights per week or every other night for the first couple of months until your skin adjusts.   Use as much oil-free moisturizer as needed to help your skin adjust to the retinoid.  There is no miracle, overnight cure for acne. It may take 6-8 weeks to start seeing some improvement, and you should continue to improve over the following months. It is important that you keep your follow up appointments so that any medication changes can be made if necessary.  Your acne may get worse before it gets better. This is normal! Just hang in there, incorporate the meds into your daily routine, and trust that the medication will work.   Do not scrub your face. Aggressive scrubbing can make acne worse. Gently washing your face 2x/day is essential to any successful acne regimen.   Do not pick or squeeze your pimples, as this will delay resolution of the picked/squeezed lesions and potentially lead to scarring. Acne is temporary, but scars are permanent.  Antibiotics: Antibiotics are sometimes prescribed to decrease acne by reducing inflammation. They are not a good long-term solution; they have side effects as all meds do; and they should always be used along with the topical treatments that are recommended.  Waxing: Stop using the retinoid 1 week prior to any waxing, as skin is more likely to tear.  Diet: Avoid eating foods with a high glycemic load/index (high sugar, simple carbs) which can worsen acne. Also, avoid drinking a lot of skim or low fat milk, and avoid the whey protein found in most protein shakes and bars, as these can also worsen  "acne.  Makeup: Use only oil-free, non-comedogenic makeup. Brands to consider include Neutrogena, Tarte, Bare Minerals, Roro [place], Jeanne Jessica, Clinique. (Avoid MAC.)  For female patients: Discontinue all oral and topical acne medications if you become pregnant or are planning to become pregnant. Notify our office, and we will direct you to medications that are safe to use during pregnancy.    Morning acne regimen:  ?  Wash face with Cerave BP 4% cleanser. See below for suggestions.  Reviewed side effects of BPO wash may include but are not limited to  irritation, bleaching of towels, bleaching of pillowcases, mild burning/tingling sensation.    ?  Apply a thin film of clindamycin to individual breakouts, or to the entire face if needed.  ?  If skin feels dry, apply a fragrance-free moisturizer, such as CeraVe AM lotion  ?  Apply a broad-spectrum sunscreen with SPF 30 or higher (This is especially important to avoid "dark spots" that can follow an acne lesion.)    Evening acne regimen:  ?  Wash face with gentle cleanser or Can use Cerave Sal acid 2% cleanser if noticing a lot of blackheads.  See below for suggestions.  ?  Apply a pea-sized amount of tretinoin 0.025% (retinoid) to the entire face.    Tretinoin: We reviewed that a pea sized amount of the topical retinoid is to be applied to the entire face at night and that it is not spot treatment. Patient to use every other night or 3 nights a week and gradually increase to goal of nightly use (as tolerated). Side effects reviewed to include but not limited to redness, dryness, flaking, burning/tingling sensation, skin irritation, and feeling of tightness.       ?  Apply a fragrance-free moisturizer, such as CeraVe PM lotion, if needed for dryness.    Cleanser options:  Gentle cleansers: CeraVe foaming wash, CeraVe hydrating cleanser, Neutrogena Ultra Gentle cleanser, Cetaphil cleanser  Benzoyl peroxide (2-5%): PanOxyl 4% Creamy Wash, Neutrogena Clear Pore " Cleanser/Mask             *Note that benzoyl peroxide can bleach towels, sheets, and clothing if not rinsed well from the skin. May be best to keep in the shower.*  Salicylic acid (0.5-2%): CeraVe Renewing SA Cleanser, Neutrogena Oil-Free Acne Wash, Neutrogena Acne Proofing Gel Cleanser      Also can use Vitamin C for lightening of PIPA.     Karnes and Pure recommended for acne scar procedures.     Return in 3 months.

## 2025-02-18 ENCOUNTER — PATIENT MESSAGE (OUTPATIENT)
Dept: OPTOMETRY | Facility: CLINIC | Age: 25
End: 2025-02-18
Payer: COMMERCIAL

## 2025-02-20 ENCOUNTER — PATIENT MESSAGE (OUTPATIENT)
Dept: DERMATOLOGY | Facility: CLINIC | Age: 25
End: 2025-02-20
Payer: COMMERCIAL

## 2025-03-14 ENCOUNTER — PROCEDURE VISIT (OUTPATIENT)
Dept: OBSTETRICS AND GYNECOLOGY | Facility: CLINIC | Age: 25
End: 2025-03-14
Payer: COMMERCIAL

## 2025-03-14 VITALS
DIASTOLIC BLOOD PRESSURE: 82 MMHG | WEIGHT: 146.38 LBS | BODY MASS INDEX: 26.94 KG/M2 | HEIGHT: 62 IN | SYSTOLIC BLOOD PRESSURE: 122 MMHG

## 2025-03-14 DIAGNOSIS — Z30.433 ENCOUNTER FOR IUD REMOVAL AND REINSERTION: Primary | ICD-10-CM

## 2025-03-14 LAB
B-HCG UR QL: NEGATIVE
CTP QC/QA: YES

## 2025-03-14 NOTE — PROCEDURES
Removal and Insertion of Intrauterine Device    Date/Time: 3/14/2025 1:20 PM    Performed by: Mylene Barber NP  Authorized by: Mylene Barber NP    Consent:     Consent obtained:  Prior to procedure the appropriate consent was completed and verified    Consent given by:  Patient    Procedure risks and benefits discussed: yes      Patient questions answered: yes      Patient agrees, verbalizes understanding, and wants to proceed: yes     Device to be inserted was verified by patient: yes    Educational handouts given: yes      Instructions and paperwork completed: yes    Removal Procedure:    IUD grasped by: ring forceps   Removed with no complications: IUD removal not due to complications   Removal due to mechanical complications: no  no   Removed due to expiration: Removal due to expiration  Insertion Procedure:   1 Intra Uterine Device levonorgestreL 52 mg       Pelvic exam performed: yes      Negative GC/chlamydia test: yes (negative GC/CT screening in 11/2024; no new sexual partners)      Negative urine pregnancy test: yes      Negative serum pregnancy test: no      Cervix cleaned and prepped: yes      Speculum placed in vagina: yes      Tenaculum applied to cervix: yes      Uterus sounded: yes      Uterus sound depth (cm):  7.5    IUD inserted with no complications: yes      Strings trimmed: yes    Post-procedure:     Patient tolerated procedure well: yes      Patient will follow up after next period: yes    Comments:      Heating pad given to patient  Topical lidocaine used throughout the procedure     PLAN     For cramping NSAIDs or Tylenol were recommended.  Counseled that irregular bleeding is common in the first several months after IUD placement.  Advised to call the office for heavy bleeding, significant pain, or a  foul-smelling vaginal discharge.  Counseled that if pregnancy does occur there is an increased chance of miscarriage or ectopic pregnancy, and she should seek medical care  immediately.  Counseled that her particular IUD is effective as contraception immediately

## 2025-03-19 ENCOUNTER — TELEPHONE (OUTPATIENT)
Dept: OPTOMETRY | Facility: CLINIC | Age: 25
End: 2025-03-19
Payer: COMMERCIAL

## 2025-03-19 ENCOUNTER — PATIENT MESSAGE (OUTPATIENT)
Dept: OPTOMETRY | Facility: CLINIC | Age: 25
End: 2025-03-19
Payer: COMMERCIAL

## 2025-03-25 ENCOUNTER — TELEPHONE (OUTPATIENT)
Dept: OPTOMETRY | Facility: CLINIC | Age: 25
End: 2025-03-25
Payer: COMMERCIAL

## 2025-04-02 ENCOUNTER — TELEPHONE (OUTPATIENT)
Dept: OBSTETRICS AND GYNECOLOGY | Facility: CLINIC | Age: 25
End: 2025-04-02
Payer: COMMERCIAL

## 2025-04-23 ENCOUNTER — OFFICE VISIT (OUTPATIENT)
Dept: OBSTETRICS AND GYNECOLOGY | Facility: CLINIC | Age: 25
End: 2025-04-23
Payer: COMMERCIAL

## 2025-04-23 VITALS
WEIGHT: 146.38 LBS | SYSTOLIC BLOOD PRESSURE: 124 MMHG | DIASTOLIC BLOOD PRESSURE: 82 MMHG | BODY MASS INDEX: 26.94 KG/M2 | HEIGHT: 62 IN

## 2025-04-23 DIAGNOSIS — Z30.431 INTRAUTERINE DEVICE SURVEILLANCE: Primary | ICD-10-CM

## 2025-04-23 PROCEDURE — 99212 OFFICE O/P EST SF 10 MIN: CPT | Mod: S$GLB,,, | Performed by: NURSE PRACTITIONER

## 2025-04-23 PROCEDURE — 99999 PR PBB SHADOW E&M-EST. PATIENT-LVL III: CPT | Mod: PBBFAC,,, | Performed by: NURSE PRACTITIONER

## 2025-04-23 NOTE — PROGRESS NOTES
"Chief Complaint: Mirena IUD Check      HPI:      Ashley is a 24 y.o. No obstetric history on file. who presents today for Mirena IUD check.      Overall amenorrheic with Mirena IUD - had 1 day of spotting. Denies any pelvic pain.     Ashley is not currently sexually active. She is currently using Mirena inserted in 03/2025 for contraception.     Previous Pap: NILM (11/15/2024)    Gardasil:Completed     Past Medical History:   Diagnosis Date    Depression     Migraines      Current Medications[1]   Review of patient's allergies indicates:  No Known Allergies  History reviewed. No pertinent surgical history.  Social History[2]  Family History   Problem Relation Name Age of Onset    Migraines Mother      Post-traumatic stress disorder Mother      Arthritis Father      Hypertension Father      Asthma Brother      Hypertension Maternal Grandmother      Diabetes Maternal Grandmother      Post-traumatic stress disorder Maternal Grandmother      Dementia Maternal Grandmother      Post-traumatic stress disorder Maternal Grandfather         Physical Exam:      PHYSICAL EXAM:  /82   Ht 5' 2" (1.575 m)   Wt 66.4 kg (146 lb 6.2 oz)   BMI 26.77 kg/m²   Body mass index is 26.77 kg/m².     APPEARANCE: Well nourished, well developed, in no acute distress.  PELVIC:  External genitalia and urethra within normal limits. Vagina without lesions, without discharge, without erythema, without ulcers.  Cervix without cervical motion tenderness, non-friable. IUD strings visualized at cervical os ~2 cm. Bimanual exam deferred.    Chaperoned by: FRANDY Callahan MA    Assessment/Plan:     Intrauterine device surveillance        PLAN:    Had Mirena IUD placed 3/2025. Tolerating method well.  IUD strings visualized at cervical os at appropriate length.  Counseled that irregular bleeding is common in the first several months after IUD placement.  Advised to call the office for heavy bleeding, significant pain, or a  foul-smelling vaginal " discharge.    Follow up for WWE/IUD check in 11/2025 .                 [1]   Current Outpatient Medications:     clindamycin phosphate 1% 1 % gel, aaa on face qam, Disp: 60 mL, Rfl: 2    sertraline 200 mg Cap, , Disp: , Rfl:     tretinoin (RETIN-A) 0.025 % cream, Apply pea-sized amount to face starting every two nights and then increasing to every other night and then nightly as tolerated. Moisturize after., Disp: 45 g, Rfl: 2    Current Facility-Administered Medications:     levonorgestreL (Mirena) 52 mg IUD 1 Intra Uterine Device, 1 Intra Uterine Device, Intrauterine, , , 1 Intra Uterine Device at 03/14/25 1320  [2]   Social History  Tobacco Use    Smoking status: Never     Passive exposure: Never    Smokeless tobacco: Never    Tobacco comments:     N/A   Substance Use Topics    Alcohol use: Yes     Alcohol/week: 2.0 - 3.0 standard drinks of alcohol     Types: 2 - 3 Glasses of wine per week     Comment: I drink on average, 2-4 different occasions per month    Drug use: Not Currently     Types: Marijuana     Comment[place]

## 2025-05-05 ENCOUNTER — OFFICE VISIT (OUTPATIENT)
Dept: OPTOMETRY | Facility: CLINIC | Age: 25
End: 2025-05-05
Payer: COMMERCIAL

## 2025-05-05 DIAGNOSIS — Z04.9 DISEASE RULED OUT AFTER EXAMINATION: Primary | ICD-10-CM

## 2025-05-05 DIAGNOSIS — H52.13 MYOPIA, BILATERAL: ICD-10-CM

## 2025-05-05 PROCEDURE — 99999 PR PBB SHADOW E&M-EST. PATIENT-LVL II: CPT | Mod: PBBFAC,,, | Performed by: OPTOMETRIST

## 2025-05-05 PROCEDURE — 92004 COMPRE OPH EXAM NEW PT 1/>: CPT | Mod: S$GLB,,, | Performed by: OPTOMETRIST

## 2025-05-05 PROCEDURE — 92015 DETERMINE REFRACTIVE STATE: CPT | Mod: S$GLB,,, | Performed by: OPTOMETRIST

## 2025-05-05 NOTE — PROGRESS NOTES
HPI     Concerns About Ocular Health     Additional comments: Patient Ashley Blum is a 24 year   old adult.           Comments    Pt here for new patient eye exam. Pt states that she feels that there has   been a slight change in [place] in MRX glasses (3 years old). Patient denies   diplopia, headaches, flashes/floaters, and pain.    [place]: 2 [place] ago out of states  PD: 64.5 mm    Meds: None    POHx: None    FOHx: (+)cataracts sx - maternal grandfather              Last edited by Ivory Ryan on 5/5/2025  2:05 PM.            Assessment /Plan     For exam results, see Encounter Report.    Disease ruled out after examination    Myopia, bilateral      Rx specs  Good overall ocular health, monitor yearly    RTC 1 year

## 2025-07-21 ENCOUNTER — TELEPHONE (OUTPATIENT)
Dept: PAIN MEDICINE | Facility: CLINIC | Age: 25
End: 2025-07-21
Payer: COMMERCIAL

## 2025-07-23 ENCOUNTER — OFFICE VISIT (OUTPATIENT)
Dept: PAIN MEDICINE | Facility: CLINIC | Age: 25
End: 2025-07-23
Payer: COMMERCIAL

## 2025-07-23 VITALS
SYSTOLIC BLOOD PRESSURE: 121 MMHG | BODY MASS INDEX: 26.61 KG/M2 | HEIGHT: 62 IN | HEART RATE: 88 BPM | WEIGHT: 144.63 LBS | DIASTOLIC BLOOD PRESSURE: 65 MMHG

## 2025-07-23 DIAGNOSIS — M54.2 MYOFASCIAL NECK PAIN: Primary | ICD-10-CM

## 2025-07-23 PROCEDURE — 99204 OFFICE O/P NEW MOD 45 MIN: CPT | Mod: S$GLB,,, | Performed by: ANESTHESIOLOGY

## 2025-07-23 PROCEDURE — 99999 PR PBB SHADOW E&M-EST. PATIENT-LVL III: CPT | Mod: PBBFAC,,, | Performed by: ANESTHESIOLOGY

## 2025-07-23 RX ORDER — METHOCARBAMOL 500 MG/1
500 TABLET, FILM COATED ORAL 2 TIMES DAILY PRN
Qty: 40 TABLET | Refills: 2 | Status: SHIPPED | OUTPATIENT
Start: 2025-07-23 | End: 2025-09-21

## 2025-07-23 NOTE — PROGRESS NOTES
PCP: Kali Kruse MD    REFERRING PHYSICIAN: Marianne Fang    CHIEF COMPLAINT: neck pain    Original HISTORY OF PRESENT ILLNESS: Ashley Blum presents to the clinic for the evaluation of the above pain. The pain started around 2020. She notes her migraines used to cause this neck pain, but now the neck pain is a trigger for her migraines.    Original Pain Description:  The pain is located in the neck and traps. The pain is described as aching. Exacerbating factors: Flexing the neck to look at the phone or computer. Mitigating factors nothing. Symptoms interfere with daily activity and work. The patient feels like symptoms have been worsening. Patient denies fever, chills, significant motor weakness and loss of sensations. She notes the pain peaks up to a 5/10 at least weekly, but involves at least 4 pain days per week.    She is on the computer a lot for work.    Original PAIN SCORES:  Best: Pain is 1  Worst: Pain is 5  Current: Pain is 1        7/23/2025     3:05 PM   Last 3 PDI Scores   Pain Disability Index (PDI) 14       INTERVAL HISTORY: (Newest visit at the bottom)   Interval History (Date):       6 weeks of Conservative therapy:  PT: none (Must include dates)  HEP:       Treatments / Medications: (Ice/Heat/NSAIDS/APAP/etc):    Tried without relief:  Tylenol  Ibuprofen minimal relief  Heat  Ice   Icy hot  message      Interventional Pain Procedures: (Previous injections)  none    Past Medical History:   Diagnosis Date    Depression     Migraines      No past surgical history on file.  Social History[1]  Family History   Problem Relation Name Age of Onset    Migraines Mother      Post-traumatic stress disorder Mother      Arthritis Father      Hypertension Father      Asthma Brother      Hypertension Maternal Grandmother      Diabetes Maternal Grandmother      Post-traumatic stress disorder Maternal Grandmother      Dementia Maternal Grandmother      Post-traumatic stress disorder  "Maternal Grandfather         Review of patient's allergies indicates:  No Known Allergies    Current Outpatient Medications   Medication Sig    clindamycin phosphate 1% 1 % gel aaa on face qam    sertraline 200 mg Cap     tretinoin (RETIN-A) 0.025 % cream Apply pea-sized amount to face starting every two nights and then increasing to every other night and then nightly as tolerated. Moisturize after.     Current Facility-Administered Medications   Medication    levonorgestreL (Mirena) 52 mg IUD 1 Intra Uterine Device       ROS:  GENERAL: No fever. No chills. No fatigue. Denies weight loss. Denies weight gain.  HEENT: Denies headaches. Denies vision change. Denies eye pain. Denies double vision. Denies ear pain.   CV: Denies chest pain.   PULM: Denies of shortness of breath.  GI: Denies constipation. No diarrhea. No abdominal pain. Denies nausea. Denies vomiting. No blood in stool.  HEME: Denies bleeding problems.  : Denies urgency. No painful urination. No blood in urine.  MS: Denies joint stiffness. Denies joint swelling.  Denies back pain.  SKIN: Denies rash.   NEURO: Denies seizures. No weakness.  PSYCH:  Denies difficulty sleeping. No anxiety. Denies depression. No suicidal thoughts.       VITALS:   Vitals:    07/23/25 1502   BP: 121/65   Pulse: 88   Weight: 65.6 kg (144 lb 10 oz)   Height: 5' 2" (1.575 m)   PainSc:   4   PainLoc: Neck         PHYSICAL EXAM:   GENERAL: Well appearing, in no acute distress, alert and oriented x3.  PSYCH:  Mood and affect appropriate.  SKIN: Skin color, texture, turgor normal, no rashes or lesions.  HEENT:  Normocephalic, atraumatic. Cranial nerves grossly intact.  NECK: TTP at the BL cervical paraspinals and BL trapezius. Facet loading to the right causes achy trap pain on the left, and Spurling on the left causes left trap pain. Slight pain with forward flexion, but otherwise No pain with neck extension, or lateral flexion.   PULM: No evidence of respiratory difficulty, " symmetric chest rise.  GI:  Non-distended  EXTREMITIES: No deformities, edema, or skin discoloration.   MUSCULOSKELETAL: Shoulder provocative maneuvers are negative. No atrophy is noted.  NEURO: Sensation is equal and appropriate bilaterally. Bilateral upper extremity strength is normal and symmetric. Bilateral upper extremity coordination and muscle stretch reflexes are physiologic and symmetric. Negative Shilpa's BL.   GAIT: normal.      LABS:      IMAGING:  none      ASSESSMENT: 25 y.o. year old female with pain, consistent with:    Encounter Diagnosis   Name Primary?    Myofascial neck pain Yes       DISCUSSION: Ashley Blum is a pleasant young woman with PMH of migraines who comes to us with neck pain which is worst with neck flexion. Exam shows pain reproduced with palpation of the cervical paraspinals and medial trapezius BL.       PLAN:  Referral to PT for myofascial neck pain with possible dry needling  Order for robaxin 500 mg BID prn for pain days.  RTC in 3 months if therapy does not resolve pain.   Consider TPI for breakthrough pain in the future.      Gus Dodd  07/23/2025           [1]   Social History  Socioeconomic History    Marital status: Single   Tobacco Use    Smoking status: Never     Passive exposure: Never    Smokeless tobacco: Never    Tobacco comments:     N/A   Substance and Sexual Activity    Alcohol use: Yes     Alcohol/week: 2.0 - 3.0 standard drinks of alcohol     Types: 2 - 3 Glasses of wine per week     Comment: I drink on average, 2-4 different occasions per month    Drug use: Not Currently     Types: Marijuana     Comment[place]    Sexual activity: Not Currently     Partners: Male     Birth control/protection: I.U.D.     Comment: I had a hormonal IUD placed in May of 2022.     Social Drivers of Health     Financial Resource Strain: Medium Risk (2/16/2025)    Overall Financial Resource Strain (CARDIA)      Difficulty of Paying Living Expenses: Somewhat hard   Food Insecurity: No Food Insecurity (2/16/2025)    Hunger Vital Sign     Worried About Running Out of Food in the Last Year: Never true     Ran Out of Food in the Last Year: Never true   Transportation Needs: Unmet Transportation Needs (2/16/2025)    PRAPARE - Transportation     Lack of Transportation (Medical): Yes     Lack of Transportation (Non-Medical): No   Physical Activity: Sufficiently Active (2/16/2025)    Exercise Vital Sign     Days of Exercise per Week: 4 days     Minutes of Exercise per Session: 40 min   Stress: Stress Concern Present (2/16/2025)    [employer] [place] [employer] - Occupational Stress Questionnaire     Feeling of Stress : To some extent   Housing Stability: Low Risk  (2/16/2025)    Housing Stability Vital Sign     Unable to Pay for Housing in the Last Year: No     Number of Times Moved in the Last Year: 0     Homeless in the Last Year: No

## 2025-08-21 ENCOUNTER — CLINICAL SUPPORT (OUTPATIENT)
Dept: REHABILITATION | Facility: HOSPITAL | Age: 25
End: 2025-08-21
Attending: ANESTHESIOLOGY
Payer: COMMERCIAL

## 2025-08-21 DIAGNOSIS — M54.2 NECK PAIN: Primary | ICD-10-CM

## 2025-08-21 PROCEDURE — 97161 PT EVAL LOW COMPLEX 20 MIN: CPT

## 2025-08-21 PROCEDURE — 97140 MANUAL THERAPY 1/> REGIONS: CPT

## 2025-08-28 ENCOUNTER — CLINICAL SUPPORT (OUTPATIENT)
Dept: REHABILITATION | Facility: HOSPITAL | Age: 25
End: 2025-08-28
Payer: COMMERCIAL

## 2025-08-28 DIAGNOSIS — M54.2 NECK PAIN: Primary | ICD-10-CM

## 2025-08-28 PROCEDURE — 97110 THERAPEUTIC EXERCISES: CPT

## 2025-08-28 PROCEDURE — 97140 MANUAL THERAPY 1/> REGIONS: CPT

## 2025-08-28 PROCEDURE — 97112 NEUROMUSCULAR REEDUCATION: CPT
